# Patient Record
Sex: MALE | Race: WHITE | NOT HISPANIC OR LATINO | Employment: OTHER | URBAN - METROPOLITAN AREA
[De-identification: names, ages, dates, MRNs, and addresses within clinical notes are randomized per-mention and may not be internally consistent; named-entity substitution may affect disease eponyms.]

---

## 2018-04-19 ENCOUNTER — OFFICE VISIT (OUTPATIENT)
Dept: OPHTHALMOLOGY | Facility: CLINIC | Age: 48
End: 2018-04-19
Payer: COMMERCIAL

## 2018-04-19 DIAGNOSIS — H10.33 ACUTE BACTERIAL CONJUNCTIVITIS OF BOTH EYES: Primary | ICD-10-CM

## 2018-04-19 DIAGNOSIS — H10.13 ALLERGIC CONJUNCTIVITIS, BILATERAL: ICD-10-CM

## 2018-04-19 PROCEDURE — 92002 INTRM OPH EXAM NEW PATIENT: CPT | Mod: S$GLB,,, | Performed by: OPTOMETRIST

## 2018-04-19 PROCEDURE — 99999 PR PBB SHADOW E&M-NEW PATIENT-LVL I: CPT | Mod: PBBFAC,,, | Performed by: OPTOMETRIST

## 2018-04-19 RX ORDER — LISINOPRIL 10 MG/1
TABLET ORAL
Refills: 11 | COMMUNITY
Start: 2018-04-16 | End: 2019-02-08

## 2018-04-19 RX ORDER — NEOMYCIN SULFATE, POLYMYXIN B SULFATE AND DEXAMETHASONE 3.5; 10000; 1 MG/ML; [USP'U]/ML; MG/ML
1 SUSPENSION/ DROPS OPHTHALMIC 4 TIMES DAILY
Qty: 10 ML | Refills: 0 | Status: SHIPPED | OUTPATIENT
Start: 2018-04-19 | End: 2018-04-29

## 2018-04-19 RX ORDER — LISDEXAMFETAMINE DIMESYLATE 50 MG/1
CAPSULE ORAL
Refills: 0 | COMMUNITY
Start: 2018-04-16 | End: 2019-02-08

## 2018-04-19 RX ORDER — POLYMYXIN B SULFATE AND TRIMETHOPRIM 1; 10000 MG/ML; [USP'U]/ML
SOLUTION OPHTHALMIC
Refills: 2 | COMMUNITY
Start: 2018-04-18 | End: 2018-04-19

## 2019-02-08 ENCOUNTER — OFFICE VISIT (OUTPATIENT)
Dept: OPHTHALMOLOGY | Facility: CLINIC | Age: 49
End: 2019-02-08
Payer: COMMERCIAL

## 2019-02-08 DIAGNOSIS — H00.12 CHALAZION OF RIGHT LOWER EYELID: ICD-10-CM

## 2019-02-08 PROCEDURE — 92012 PR EYE EXAM, EST PATIENT,INTERMED: ICD-10-PCS | Mod: 25,S$GLB,, | Performed by: OPHTHALMOLOGY

## 2019-02-08 PROCEDURE — 99999 PR PBB SHADOW E&M-EST. PATIENT-LVL II: ICD-10-PCS | Mod: PBBFAC,,, | Performed by: OPHTHALMOLOGY

## 2019-02-08 PROCEDURE — 67800 REMOVE EYELID LESION: CPT | Mod: E4,S$GLB,, | Performed by: OPHTHALMOLOGY

## 2019-02-08 PROCEDURE — 92012 INTRM OPH EXAM EST PATIENT: CPT | Mod: 25,S$GLB,, | Performed by: OPHTHALMOLOGY

## 2019-02-08 PROCEDURE — 99999 PR PBB SHADOW E&M-EST. PATIENT-LVL II: CPT | Mod: PBBFAC,,, | Performed by: OPHTHALMOLOGY

## 2019-02-08 PROCEDURE — 67800 PR EXCIS CHALAZION,SINGLE: ICD-10-PCS | Mod: E4,S$GLB,, | Performed by: OPHTHALMOLOGY

## 2019-02-08 PROCEDURE — 87070 CULTURE OTHR SPECIMN AEROBIC: CPT

## 2019-02-08 RX ORDER — NEOMYCIN SULFATE, POLYMYXIN B SULFATE, AND DEXAMETHASONE 3.5; 10000; 1 MG/G; [USP'U]/G; MG/G
OINTMENT OPHTHALMIC 2 TIMES DAILY
Qty: 3.5 G | Refills: 1 | Status: SHIPPED | OUTPATIENT
Start: 2019-02-08

## 2019-02-08 RX ORDER — HYDROCODONE BITARTRATE AND ACETAMINOPHEN 7.5; 325 MG/1; MG/1
1 TABLET ORAL EVERY 6 HOURS PRN
Qty: 7 TABLET | Refills: 0 | Status: SHIPPED | OUTPATIENT
Start: 2019-02-08

## 2019-02-08 RX ORDER — SULFAMETHOXAZOLE AND TRIMETHOPRIM 800; 160 MG/1; MG/1
1 TABLET ORAL 2 TIMES DAILY
Qty: 20 TABLET | Refills: 2 | Status: SHIPPED | OUTPATIENT
Start: 2019-02-08 | End: 2019-02-18

## 2019-02-08 NOTE — PROGRESS NOTES
HPI     Patient c/o bump RLL inside lid   For one week patient states he has used   w.c.ad it made it worse., Patient has a h/o scl in [facility] only for reading and   has seen TRF last year.        NP  H/O SCL WEAR  2016/WEARS +1.25 FOR READING ONLY    Last edited by ZAK Fragoso on 2/8/2019  1:11 PM. (History)            Assessment /Plan     For exam results, see Encounter Report.      ICD-10-CM ICD-9-CM    1. Chalazion of right lower eyelid H00.12 373.2 Aerobic culture       Preop Diagnosis: Chalazion of the left lower lid   Post Op diagnosis : the same    Procedure : Incision and Drainage of Chalazion(s)    Complications: none    Surgeon : Milind Mccormack    Anesthesia : Local infiltration    After obtaining informed consent, the patient was brought to the minor OR suite. Local infiltration with 2%Xylocaine with epi was delivered.     A sterile prep and drape was performed and the effected eyelid was everted with a chalazion clamp. The chalazion was opened with a 11 blade and the contents were removed with a curette. The surrounding sac was isolated and excised with 0.12 forseps and vannas scissors. Bleeding was controlled with digital pressure.     The patient tolerated the procedure well and a pressure patch was applied as needed with steroid antibiotic ophthalmic ointment. The patient was discharged in satisfactory condition and will return as needed.    Use Norco 1 tablet every 4-6 hrs PRN for pain   Start bactrim BS 1 tablet BID x 7 days Po   Pressure patch applied today and use ointment at bedtime   Culture was taken today and sent to the lab

## 2019-02-12 LAB — BACTERIA SPEC AEROBE CULT: NO GROWTH

## 2019-11-14 RX ORDER — OFLOXACIN 3 MG/ML
1 SOLUTION/ DROPS OPHTHALMIC 4 TIMES DAILY
Qty: 5 ML | Refills: 3 | Status: SHIPPED | OUTPATIENT
Start: 2019-11-14 | End: 2019-11-28

## 2021-05-22 NOTE — PROGRESS NOTES
"HPI     Both eyes red, swollen, itchy, mucus in eyes. Patient started Polytrim   last night around 7pm. New patient last eye exam couple of years.     Last edited by Keyshawn Vargas, OD on 4/19/2018  1:42 PM. (History)            Assessment /Plan     For exam results, see Encounter Report.    Acute bacterial conjunctivitis of both eyes  -     neomycin-polymyxin-dexamethasone (MAXITROL) 3.5mg/mL-10,000 unit/mL-0.1 % DrpS; Place 1 drop into both eyes 4 (four) times daily.  Dispense: 10 mL; Refill: 0    Allergic conjunctivitis, bilateral  -     neomycin-polymyxin-dexamethasone (MAXITROL) 3.5mg/mL-10,000 unit/mL-0.1 % DrpS; Place 1 drop into both eyes 4 (four) times daily.  Dispense: 10 mL; Refill: 0      Possible foreign body yesterday while "weed-eating".  FB sensation went away after using drops the first time.    Everted lids OU, no FB present.  No Jeanie's Sign OU.  No PA node.    Start Maxitrol qid OU x 10 days.    RTC prn               "
Patient